# Patient Record
Sex: MALE | Race: WHITE | NOT HISPANIC OR LATINO | Employment: OTHER | URBAN - NONMETROPOLITAN AREA
[De-identification: names, ages, dates, MRNs, and addresses within clinical notes are randomized per-mention and may not be internally consistent; named-entity substitution may affect disease eponyms.]

---

## 2024-03-25 PROBLEM — R03.0 ELEVATED BLOOD PRESSURE READING: Status: ACTIVE | Noted: 2024-03-25

## 2024-03-25 PROBLEM — E78.5 HLD (HYPERLIPIDEMIA): Status: ACTIVE | Noted: 2024-03-25

## 2024-03-25 PROBLEM — I25.10 CORONARY ARTERY DISEASE INVOLVING NATIVE CORONARY ARTERY OF NATIVE HEART WITHOUT ANGINA PECTORIS: Status: ACTIVE | Noted: 2024-03-25

## 2024-03-25 PROBLEM — Z98.61 HISTORY OF PTCA: Status: ACTIVE | Noted: 2024-03-25

## 2024-03-25 PROBLEM — R00.2 PALPITATIONS: Status: ACTIVE | Noted: 2024-03-25

## 2024-03-25 PROBLEM — K21.9 GERD (GASTROESOPHAGEAL REFLUX DISEASE): Status: ACTIVE | Noted: 2024-03-25

## 2024-03-25 PROBLEM — I10 HYPERTENSION: Status: ACTIVE | Noted: 2024-03-25

## 2024-03-25 RX ORDER — ROSUVASTATIN CALCIUM 20 MG/1
1 TABLET, COATED ORAL NIGHTLY
COMMUNITY
Start: 2022-01-12 | End: 2024-05-16 | Stop reason: SDUPTHER

## 2024-03-25 RX ORDER — FLUTICASONE PROPIONATE 50 MCG
1 SPRAY, SUSPENSION (ML) NASAL DAILY
COMMUNITY

## 2024-03-25 RX ORDER — ASPIRIN 81 MG/1
1 TABLET ORAL DAILY
COMMUNITY

## 2024-03-25 RX ORDER — NITROGLYCERIN 0.4 MG/1
0.4 TABLET SUBLINGUAL EVERY 5 MIN PRN
COMMUNITY

## 2024-03-25 RX ORDER — SILDENAFIL 100 MG/1
50 TABLET, FILM COATED ORAL AS NEEDED
COMMUNITY
Start: 2022-02-28

## 2024-03-25 RX ORDER — METOPROLOL TARTRATE 25 MG/1
12.5 TABLET, FILM COATED ORAL 2 TIMES DAILY
COMMUNITY
Start: 2021-07-27 | End: 2024-05-16 | Stop reason: SDUPTHER

## 2024-04-04 ENCOUNTER — HOSPITAL ENCOUNTER (OUTPATIENT)
Dept: CARDIOLOGY | Facility: CLINIC | Age: 69
Discharge: HOME | End: 2024-04-04
Payer: MEDICARE

## 2024-04-04 VITALS — HEART RATE: 67 BPM | SYSTOLIC BLOOD PRESSURE: 124 MMHG | DIASTOLIC BLOOD PRESSURE: 86 MMHG

## 2024-04-04 DIAGNOSIS — I25.10 ATHEROSCLEROSIS OF NATIVE CORONARY ARTERY, UNSPECIFIED WHETHER ANGINA PRESENT, UNSPECIFIED WHETHER NATIVE OR TRANSPLANTED HEART: ICD-10-CM

## 2024-04-04 PROCEDURE — 93017 CV STRESS TEST TRACING ONLY: CPT

## 2024-04-04 PROCEDURE — 93018 CV STRESS TEST I&R ONLY: CPT | Performed by: INTERNAL MEDICINE

## 2024-04-04 PROCEDURE — 93016 CV STRESS TEST SUPVJ ONLY: CPT | Performed by: INTERNAL MEDICINE

## 2024-04-09 ENCOUNTER — TELEPHONE (OUTPATIENT)
Dept: CARDIOLOGY | Facility: CLINIC | Age: 69
End: 2024-04-09
Payer: MEDICARE

## 2024-04-09 NOTE — TELEPHONE ENCOUNTER
----- Message from Italo Garcia DO sent at 4/9/2024 12:13 PM EDT -----  Let patient know stress test was acceptable and normal

## 2024-05-16 ENCOUNTER — OFFICE VISIT (OUTPATIENT)
Dept: CARDIOLOGY | Facility: CLINIC | Age: 69
End: 2024-05-16
Payer: MEDICARE

## 2024-05-16 VITALS
DIASTOLIC BLOOD PRESSURE: 82 MMHG | WEIGHT: 207 LBS | HEART RATE: 72 BPM | SYSTOLIC BLOOD PRESSURE: 134 MMHG | HEIGHT: 71 IN | BODY MASS INDEX: 28.98 KG/M2

## 2024-05-16 DIAGNOSIS — Z87.891 FORMER SMOKER: ICD-10-CM

## 2024-05-16 DIAGNOSIS — E78.5 HYPERLIPIDEMIA, UNSPECIFIED HYPERLIPIDEMIA TYPE: ICD-10-CM

## 2024-05-16 DIAGNOSIS — I10 HYPERTENSION, UNSPECIFIED TYPE: ICD-10-CM

## 2024-05-16 DIAGNOSIS — I25.10 CORONARY ARTERY DISEASE INVOLVING NATIVE CORONARY ARTERY OF NATIVE HEART WITHOUT ANGINA PECTORIS: ICD-10-CM

## 2024-05-16 PROCEDURE — 1036F TOBACCO NON-USER: CPT | Performed by: INTERNAL MEDICINE

## 2024-05-16 PROCEDURE — 1160F RVW MEDS BY RX/DR IN RCRD: CPT | Performed by: INTERNAL MEDICINE

## 2024-05-16 PROCEDURE — 1159F MED LIST DOCD IN RCRD: CPT | Performed by: INTERNAL MEDICINE

## 2024-05-16 PROCEDURE — 3079F DIAST BP 80-89 MM HG: CPT | Performed by: INTERNAL MEDICINE

## 2024-05-16 PROCEDURE — 99213 OFFICE O/P EST LOW 20 MIN: CPT | Performed by: INTERNAL MEDICINE

## 2024-05-16 PROCEDURE — 3008F BODY MASS INDEX DOCD: CPT | Performed by: INTERNAL MEDICINE

## 2024-05-16 PROCEDURE — 3075F SYST BP GE 130 - 139MM HG: CPT | Performed by: INTERNAL MEDICINE

## 2024-05-16 RX ORDER — UBIDECARENONE 30 MG
30 CAPSULE ORAL DAILY
COMMUNITY

## 2024-05-16 RX ORDER — METOPROLOL TARTRATE 25 MG/1
12.5 TABLET, FILM COATED ORAL 2 TIMES DAILY
Qty: 90 TABLET | Refills: 3 | Status: SHIPPED | OUTPATIENT
Start: 2024-05-16 | End: 2025-05-16

## 2024-05-16 RX ORDER — ROSUVASTATIN CALCIUM 20 MG/1
20 TABLET, COATED ORAL NIGHTLY
Qty: 90 TABLET | Refills: 3 | Status: SHIPPED | OUTPATIENT
Start: 2024-05-16 | End: 2025-05-16

## 2024-05-16 NOTE — PROGRESS NOTES
"Subjective   Clem Linares is a 69 y.o. male       Chief Complaint    Annual Exam          69-year-old gentleman returns for annual follow-up he is doing well he passed treadmill stress test this past year on the Alex protocol stage II at 90+ percent of his heart rate.  He is otherwise doing well with no angina, nitrate usage or recurrent hospitalizations.    2019 he underwent PCI of the LAD for  intervention successfully.  He has had no problems since then    Recommendations, obtain lipid panel from primary care physician versus ourselves, follow-up in 1 year, get back to exercise.         Review of Systems   All other systems reviewed and are negative.           Vitals:    05/16/24 1029   BP: 134/82   BP Location: Right arm   Patient Position: Sitting   Pulse: 72   Weight: 93.9 kg (207 lb)   Height: 1.803 m (5' 11\")        Objective   Physical Exam  Constitutional:       Appearance: Normal appearance.   HENT:      Nose: Nose normal.   Neck:      Vascular: No carotid bruit.   Cardiovascular:      Rate and Rhythm: Normal rate.      Pulses: Normal pulses.      Heart sounds: Normal heart sounds.   Pulmonary:      Effort: Pulmonary effort is normal.   Abdominal:      General: Bowel sounds are normal.      Palpations: Abdomen is soft.   Musculoskeletal:         General: Normal range of motion.      Cervical back: Normal range of motion.      Right lower leg: No edema.      Left lower leg: No edema.   Skin:     General: Skin is warm and dry.   Neurological:      General: No focal deficit present.      Mental Status: He is alert.   Psychiatric:         Mood and Affect: Mood normal.         Behavior: Behavior normal.         Thought Content: Thought content normal.         Judgment: Judgment normal.         Allergies  Ace inhibitors     Current Medications    Current Outpatient Medications:     aspirin 81 mg EC tablet, Take 1 tablet (81 mg) by mouth once daily., Disp: , Rfl:     co-enzyme Q-10 30 mg capsule, Take 1 " capsule (30 mg) by mouth once daily., Disp: , Rfl:     fluticasone (Flonase Allergy Relief) 50 mcg/actuation nasal spray, Administer 1 spray into affected nostril(s) once daily., Disp: , Rfl:     metoprolol tartrate (Lopressor) 25 mg tablet, Take 0.5 tablets (12.5 mg) by mouth 2 times a day., Disp: , Rfl:     multivit with minerals/lutein (MULTIVITAMIN 50 PLUS ORAL), Take 1 tablet by mouth once daily., Disp: , Rfl:     nitroglycerin (Nitrostat) 0.4 mg SL tablet, Place 1 tablet (0.4 mg) under the tongue every 5 minutes if needed for chest pain., Disp: , Rfl:     rosuvastatin (Crestor) 20 mg tablet, Take 1 tablet (20 mg) by mouth once daily at bedtime., Disp: , Rfl:     sildenafil (Viagra) 100 mg tablet, Take 0.5 tablets (50 mg) by mouth if needed., Disp: , Rfl:                      Assessment/Plan   1. Coronary artery disease involving native coronary artery of native heart without angina pectoris        2. Hypertension, unspecified type        3. Hyperlipidemia, unspecified hyperlipidemia type        4. BMI 28.0-28.9,adult        5. Former smoker                 Scribe Attestation  By signing my name below, INaheed LPN, Scribe   attest that this documentation has been prepared under the direction and in the presence of Italo Garcia DO.     Provider Attestation - Scribe documentation    All medical record entries made by the Scribe were at my direction and personally dictated by me. I have reviewed the chart and agree that the record accurately reflects my personal performance of the history, physical exam, discussion and plan.

## 2024-05-16 NOTE — LETTER
"May 16, 2024     Maribell Chavez,   [address] [facility] [address]    Patient: Clem Linares   YOB: 1955   Date of Visit: 5/16/2024       Dear Dr. Maribell Chavez, DO:    Thank you for referring Clem Linares to me for evaluation. Below are my notes for this consultation.  If you have questions, please do not hesitate to call me. I look forward to following your patient along with you.       Sincerely,     Italo Garcia,       CC: No Recipients  ______________________________________________________________________________________    Subjective   Clem Linares is a 69 y.o. male       Chief Complaint    Annual Exam          69-year-old gentleman returns for annual follow-up he is doing well he passed treadmill stress test this past year on the Alex protocol stage II at 90+ percent of his heart rate.  He is otherwise doing well with no angina, nitrate usage or recurrent hospitalizations.    2019 he underwent PCI of the LAD for  intervention successfully.  He has had no problems since then    Recommendations, obtain lipid panel from primary care physician versus ourselves, follow-up in 1 year, get back to exercise.         Review of Systems   All other systems reviewed and are negative.           Vitals:    05/16/24 1029   BP: 134/82   BP Location: Right arm   Patient Position: Sitting   Pulse: 72   Weight: 93.9 kg (207 lb)   Height: 1.803 m (5' 11\")        Objective   Physical Exam  Constitutional:       Appearance: Normal appearance.   HENT:      Nose: Nose normal.   Neck:      Vascular: No carotid bruit.   Cardiovascular:      Rate and Rhythm: Normal rate.      Pulses: Normal pulses.      Heart sounds: Normal heart sounds.   Pulmonary:      Effort: Pulmonary effort is normal.   Abdominal:      General: Bowel sounds are normal.      Palpations: Abdomen is soft.   Musculoskeletal:         General: Normal range of motion.      Cervical back: Normal range of " motion.      Right lower leg: No edema.      Left lower leg: No edema.   Skin:     General: Skin is warm and dry.   Neurological:      General: No focal deficit present.      Mental Status: He is alert.   Psychiatric:         Mood and Affect: Mood normal.         Behavior: Behavior normal.         Thought Content: Thought content normal.         Judgment: Judgment normal.         Allergies  Ace inhibitors     Current Medications    Current Outpatient Medications:   •  aspirin 81 mg EC tablet, Take 1 tablet (81 mg) by mouth once daily., Disp: , Rfl:   •  co-enzyme Q-10 30 mg capsule, Take 1 capsule (30 mg) by mouth once daily., Disp: , Rfl:   •  fluticasone (Flonase Allergy Relief) 50 mcg/actuation nasal spray, Administer 1 spray into affected nostril(s) once daily., Disp: , Rfl:   •  metoprolol tartrate (Lopressor) 25 mg tablet, Take 0.5 tablets (12.5 mg) by mouth 2 times a day., Disp: , Rfl:   •  multivit with minerals/lutein (MULTIVITAMIN 50 PLUS ORAL), Take 1 tablet by mouth once daily., Disp: , Rfl:   •  nitroglycerin (Nitrostat) 0.4 mg SL tablet, Place 1 tablet (0.4 mg) under the tongue every 5 minutes if needed for chest pain., Disp: , Rfl:   •  rosuvastatin (Crestor) 20 mg tablet, Take 1 tablet (20 mg) by mouth once daily at bedtime., Disp: , Rfl:   •  sildenafil (Viagra) 100 mg tablet, Take 0.5 tablets (50 mg) by mouth if needed., Disp: , Rfl:                      Assessment/Plan   1. Coronary artery disease involving native coronary artery of native heart without angina pectoris        2. Hypertension, unspecified type        3. Hyperlipidemia, unspecified hyperlipidemia type        4. BMI 28.0-28.9,adult        5. Former smoker                 Scribe Attestation  By signing my name below, Naheed GALVAN LPN , Scribjose ramon   attest that this documentation has been prepared under the direction and in the presence of Italo Garcia DO.     Provider Attestation - Scribe documentation    All medical record entries  made by the Scribe were at my direction and personally dictated by me. I have reviewed the chart and agree that the record accurately reflects my personal performance of the history, physical exam, discussion and plan.

## 2024-07-16 LAB
NON-UH HIE ALANINE AMINOTRANSFERASE: 16 U/L (ref 7–52)
NON-UH HIE ASPARTATE AMINO TRANSFERASE: 25 U/L (ref 13–39)
NON-UH HIE CHOL/HDL RATIO: 2.8
NON-UH HIE CHOLESTEROL: 145 MG/DL (ref 140–200)
NON-UH HIE HDL CHOLESTEROL: 52 MG/DL (ref 23–92)
NON-UH HIE LDL CHOLESTEROL,CALCULATED: 76 MG/DL (ref 0–100)
NON-UH HIE TRIGLYCERIDE W/REFLEX: 86 MG/DL (ref 0–149)
NON-UH HIE VLDL CHOLESTEROL: 17 MG/DL

## 2025-05-21 ENCOUNTER — APPOINTMENT (OUTPATIENT)
Dept: CARDIOLOGY | Facility: CLINIC | Age: 70
End: 2025-05-21
Payer: MEDICARE

## 2025-05-21 VITALS
HEART RATE: 62 BPM | WEIGHT: 207.8 LBS | DIASTOLIC BLOOD PRESSURE: 72 MMHG | HEIGHT: 71 IN | SYSTOLIC BLOOD PRESSURE: 138 MMHG | BODY MASS INDEX: 29.09 KG/M2

## 2025-05-21 DIAGNOSIS — I10 PRIMARY HYPERTENSION: ICD-10-CM

## 2025-05-21 DIAGNOSIS — I25.10 CORONARY ARTERY DISEASE INVOLVING NATIVE CORONARY ARTERY OF NATIVE HEART WITHOUT ANGINA PECTORIS: ICD-10-CM

## 2025-05-21 DIAGNOSIS — Z87.891 FORMER SMOKER: ICD-10-CM

## 2025-05-21 DIAGNOSIS — E78.2 MIXED HYPERLIPIDEMIA: ICD-10-CM

## 2025-05-21 DIAGNOSIS — Z98.61 HISTORY OF PTCA: ICD-10-CM

## 2025-05-21 PROBLEM — R03.0 ELEVATED BLOOD PRESSURE READING: Status: RESOLVED | Noted: 2024-03-25 | Resolved: 2025-05-21

## 2025-05-21 PROCEDURE — 1036F TOBACCO NON-USER: CPT | Performed by: INTERNAL MEDICINE

## 2025-05-21 PROCEDURE — 99213 OFFICE O/P EST LOW 20 MIN: CPT | Performed by: INTERNAL MEDICINE

## 2025-05-21 PROCEDURE — 3078F DIAST BP <80 MM HG: CPT | Performed by: INTERNAL MEDICINE

## 2025-05-21 PROCEDURE — 1159F MED LIST DOCD IN RCRD: CPT | Performed by: INTERNAL MEDICINE

## 2025-05-21 PROCEDURE — 3075F SYST BP GE 130 - 139MM HG: CPT | Performed by: INTERNAL MEDICINE

## 2025-05-21 PROCEDURE — 3008F BODY MASS INDEX DOCD: CPT | Performed by: INTERNAL MEDICINE

## 2025-05-21 PROCEDURE — 1160F RVW MEDS BY RX/DR IN RCRD: CPT | Performed by: INTERNAL MEDICINE

## 2025-05-21 RX ORDER — MULTIVITAMIN/IRON/FOLIC ACID 18MG-0.4MG
1 TABLET ORAL DAILY
COMMUNITY

## 2025-05-27 DIAGNOSIS — E78.5 HYPERLIPIDEMIA, UNSPECIFIED HYPERLIPIDEMIA TYPE: ICD-10-CM

## 2025-05-27 DIAGNOSIS — I10 HYPERTENSION, UNSPECIFIED TYPE: ICD-10-CM

## 2025-05-27 DIAGNOSIS — R00.2 PALPITATIONS: ICD-10-CM

## 2025-05-29 RX ORDER — ROSUVASTATIN CALCIUM 20 MG/1
20 TABLET, COATED ORAL NIGHTLY
Qty: 90 TABLET | Refills: 3 | Status: SHIPPED | OUTPATIENT
Start: 2025-05-29 | End: 2026-05-29

## 2025-05-29 RX ORDER — METOPROLOL TARTRATE 25 MG/1
12.5 TABLET, FILM COATED ORAL 2 TIMES DAILY
Qty: 90 TABLET | Refills: 3 | Status: SHIPPED | OUTPATIENT
Start: 2025-05-29 | End: 2026-05-29

## 2026-05-21 ENCOUNTER — APPOINTMENT (OUTPATIENT)
Dept: CARDIOLOGY | Facility: CLINIC | Age: 71
End: 2026-05-21
Payer: MEDICARE